# Patient Record
Sex: MALE | Race: WHITE | NOT HISPANIC OR LATINO | Employment: FULL TIME | URBAN - METROPOLITAN AREA
[De-identification: names, ages, dates, MRNs, and addresses within clinical notes are randomized per-mention and may not be internally consistent; named-entity substitution may affect disease eponyms.]

---
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
## 2022-04-30 NOTE — OP NOTE
ADMITTING DIAGNOSIS:  Acute appendicitis.    PROCEDURE:  Laparoscopic appendectomy.    POSTOPERATIVE DIAGNOSIS:  Acute pelvic appendicitis, non perforated.    INDICATION:  The patient is an 20-year-old male with a history of midepigastric/right lower quadrant abdominal pain beginning about 4 o'clock a.m. with nausea and bloating.  No vomiting.  Has a white count of 20,000.  CT suggestive of pelvic appendicitis.  Patient was consented for laparoscopic appendectomy.    DESCRIPTION OF PROCEDURE:  He was brought to the operating room in supine position.  General anesthetic.  Prepped and draped in a sterile fashion.  Had a supraumbilical incision made with insertion of Cookie needle, insufflation of abdomen to 14 mmHg.  The patient had a 5 mm trocar placed supraumbilically, then had a 5 mm trocar placed in the right upper quadrant, and another 5 mm trocar placed in the left lower quadrant.  The patient underwent grasping and mobilization of the appendix, which was noted to be a pelvic appendicitis.  The mesoappendix was ligated with hemoclips, and patient had the base of the appendix ligated with an Endoloop and resected with Endo Black.  The patient had good hemostasis, no rupture of the appendix.  The appendix was removed through the right upper quadrant trocar site.  The aponeuroses of the 5 mm trocar sites were closed with 0 Vicryl on a  needle.  Subcu was closed with 4-0 plain gut suture.  Dermabond was used for the skin.  Sterile dressings were applied.  No problems were encountered.  Patient tolerated procedure well.       I appreciate the consultation referral from Dr. Fernandes and Dr. Don Jarrell.      BBS/MODL   DD: 01/26/2021 1827   DT: 01/26/2021 1847  Job # [id number]    cc: Dr. Dena Jarrell

## 2022-04-30 NOTE — H&P
ADMITTING DIAGNOSES:    1. Midepigastric/right lower quadrant abdominal pain.    2. White count of 20,000.    3. CT suggestive of acute appendicitis.    BRIEF HISTORY:  The patient is an 20-year-old male with midepigastric/right lower quadrant abdominal pain beginning about 4 o'clock a.m. yesterday.  The patient had nausea with bloating.  No vomiting.  White count of 20,000.  CT with suggestion of a pelvic appendicitis.    ALLERGIES:  PATIENT HAS NO KNOWN DRUG ALLERGIES.      PAST MEDICAL HISTORY:  Pertinent for asthma.    MEDICATIONS:  On Afrin only as his medication.    SURGICAL HISTORY:  Includes:    1. Tonsil and adenoid resection and tubes placed in his ears.    2. Left elbow olecranon fracture repair.    3. No EGD or colonoscopy.    4. No stress test, angio, or echocardiogram.    IMMUNIZATIONS:    1. Tetanus shot within 5 years.    2. No flu shot.    3. No HPV shot.    FAMILY HISTORY:    1. Mother is healthy.    2. Father , murdered via drive-by shooting.  He has been raised by his stepdad since he was 3.    SOCIAL HISTORY:    1. Smokes a quarter pack a day for 2 years.    2. Drinks occasional beer every month for about 2 years.    3. Does weed, 2 joints a day for about 2-3 years.    4. No nursing home, parole, or probation.    5. No  service.    6. No rehab.    7. Has a high school level of education.    8. Works in [employer].    9. He is unmarried.    10. No children.    11. Lives in [place].    REVIEW OF SYSTEMS:  Twelve-point review of systems otherwise unremarkable.    PHYSICAL EXAMINATION:  VITAL SIGNS:  Stable.     HEAD, EARS, NOSE, THROAT:  Normal.     NECK:  Supple, nontender.     HEART:  Regular rate and rhythm.     LUNGS:  Clear to auscultation.     ABDOMEN:  Shows right lower quadrant tenderness.  He does have positive psoas and obturator signs.     EXTREMITIES:  Within normal limits.  No clubbing, cyanosis, edema.     NEUROLOGICAL:  He is intact.    IMPRESSION:  My impression is that  of an 20-year-old male with a history of midepigastric/right lower quadrant abdominal pain, nausea, bloating, vomiting, inability to maintain diet.  Has a 20,000 white count and a CT suggestive of pelvic appendicitis.    PLAN:  Laparoscopic appendectomy.        FRANCO/ERICKA   DD: 01/26/2021 1825   DT: 01/26/2021 1841  Job # [id number]